# Patient Record
Sex: MALE | Race: OTHER | NOT HISPANIC OR LATINO | URBAN - METROPOLITAN AREA
[De-identification: names, ages, dates, MRNs, and addresses within clinical notes are randomized per-mention and may not be internally consistent; named-entity substitution may affect disease eponyms.]

---

## 2018-06-03 ENCOUNTER — EMERGENCY (EMERGENCY)
Facility: HOSPITAL | Age: 68
LOS: 1 days | Discharge: HOME | End: 2018-06-03
Attending: EMERGENCY MEDICINE
Payer: COMMERCIAL

## 2018-06-03 VITALS
OXYGEN SATURATION: 98 % | RESPIRATION RATE: 18 BRPM | TEMPERATURE: 99 F | DIASTOLIC BLOOD PRESSURE: 83 MMHG | SYSTOLIC BLOOD PRESSURE: 128 MMHG | HEART RATE: 97 BPM

## 2018-06-03 VITALS
TEMPERATURE: 98 F | OXYGEN SATURATION: 96 % | SYSTOLIC BLOOD PRESSURE: 168 MMHG | RESPIRATION RATE: 18 BRPM | DIASTOLIC BLOOD PRESSURE: 108 MMHG | WEIGHT: 149.91 LBS | HEART RATE: 106 BPM | HEIGHT: 66 IN

## 2018-06-03 PROCEDURE — 96372 THER/PROPH/DIAG INJ SC/IM: CPT

## 2018-06-03 PROCEDURE — 93005 ELECTROCARDIOGRAM TRACING: CPT

## 2018-06-03 PROCEDURE — 99283 EMERGENCY DEPT VISIT LOW MDM: CPT | Mod: 25

## 2018-06-03 PROCEDURE — 99284 EMERGENCY DEPT VISIT MOD MDM: CPT | Mod: 25

## 2018-06-03 RX ORDER — DIAZEPAM 5 MG
5 TABLET ORAL ONCE
Qty: 0 | Refills: 0 | Status: DISCONTINUED | OUTPATIENT
Start: 2018-06-03 | End: 2018-06-03

## 2018-06-03 RX ORDER — KETOROLAC TROMETHAMINE 30 MG/ML
30 SYRINGE (ML) INJECTION ONCE
Qty: 0 | Refills: 0 | Status: DISCONTINUED | OUTPATIENT
Start: 2018-06-03 | End: 2018-06-03

## 2018-06-03 RX ORDER — DIAZEPAM 5 MG
1 TABLET ORAL
Qty: 10 | Refills: 0 | OUTPATIENT
Start: 2018-06-03

## 2018-06-03 RX ORDER — OXYCODONE AND ACETAMINOPHEN 5; 325 MG/1; MG/1
1 TABLET ORAL ONCE
Qty: 0 | Refills: 0 | Status: DISCONTINUED | OUTPATIENT
Start: 2018-06-03 | End: 2018-06-03

## 2018-06-03 RX ADMIN — Medication 30 MILLIGRAM(S): at 03:44

## 2018-06-03 RX ADMIN — OXYCODONE AND ACETAMINOPHEN 1 TABLET(S): 5; 325 TABLET ORAL at 03:44

## 2018-06-03 RX ADMIN — Medication 5 MILLIGRAM(S): at 03:44

## 2018-06-03 RX ADMIN — OXYCODONE AND ACETAMINOPHEN 1 TABLET(S): 5; 325 TABLET ORAL at 04:51

## 2018-06-03 RX ADMIN — Medication 30 MILLIGRAM(S): at 04:51

## 2018-06-03 NOTE — ED PROVIDER NOTE - OBJECTIVE STATEMENT
68 y/o with PMHx of HTN, HLD, UTI, arthritis and no significant PSHx presents to the ED with c/o neck pain, and chronic pain down right arm. Pt had MRI showing multiple pinched nerves. Pt has had multiple treatments including pills, epidural and steroids to little relief. Pt reports numbness to right hand which is related to chronic arm pain. Denies acute trauma, weakness, tingling or any other complaints. NKDA.

## 2018-06-03 NOTE — ED PROVIDER NOTE - MEDICAL DECISION MAKING DETAILS
66 y/o M presents with chronic neck pain without weakness on examination. Will try pain medications and reassess.

## 2018-06-03 NOTE — ED ADULT TRIAGE NOTE - STATUS:
Care After Your Extracorporeal Shock Wave Lithotripsy (ESWL)  Dr. Thompson/Dr. Paz/Dr. Rios/Dr. Nath/Dr. Huff/Dr. Galvan  [phone]    Activity  • For the next 24 hours: Do not stay alone, drive a car, operate electrical/power tools or appliances, drink alcohol, or sign any legal papers.  • You should rest for the next 12-24 hours.    Bathing  You may bathe at any time.    Diet   Drink a lot of water.  Have clear liquids and a light meal today.  You may return to your normal diet tomorrow.    Medications  If you are having pain, you may take Tylenol (acetaminophen) 2 tablets every 4 hours as needed for pain.    Special Instructions  Strain all urine for 3 days, and take the fragments to your surgeon.    You may have the following  • Some burning pain with urination.  • Mild pain at the site or lower abdomen.  • Bloody urine for 2-3 days.  • Some redness, swelling, and/or bruising at the site.  • Urinating often.    Call your doctor if you have the following  • Pain that is not controlled by medication.  Pain should not be severe.  • Increasing or constant bleeding or clots in the urine.  • Not passing urine for a 12-hour period.  • Fever higher than 101.5 degrees.  • Constant nausea.    Follow up appointment at [facility] on December 18th at 2:15pm. You will need to have a renal ultrasound and a KUB prior to that day. You will receive a call with more information about date and time for both of those.     Applied

## 2018-06-03 NOTE — ED ADULT NURSE NOTE - OBJECTIVE STATEMENT
Patient presents to ED with neck pain; no difficulty breathing or facial swelling noted. Patient is able to move upper body.

## 2018-06-04 ENCOUNTER — EMERGENCY (EMERGENCY)
Facility: HOSPITAL | Age: 68
LOS: 1 days | Discharge: HOME | End: 2018-06-04
Attending: EMERGENCY MEDICINE
Payer: COMMERCIAL

## 2018-06-04 VITALS
SYSTOLIC BLOOD PRESSURE: 137 MMHG | OXYGEN SATURATION: 97 % | HEIGHT: 66 IN | DIASTOLIC BLOOD PRESSURE: 91 MMHG | TEMPERATURE: 98 F | HEART RATE: 106 BPM | WEIGHT: 149.91 LBS | RESPIRATION RATE: 17 BRPM

## 2018-06-04 PROCEDURE — 99283 EMERGENCY DEPT VISIT LOW MDM: CPT | Mod: 25

## 2018-06-04 PROCEDURE — 96372 THER/PROPH/DIAG INJ SC/IM: CPT | Mod: 76

## 2018-06-04 RX ORDER — HYDROMORPHONE HYDROCHLORIDE 2 MG/ML
1 INJECTION INTRAMUSCULAR; INTRAVENOUS; SUBCUTANEOUS ONCE
Qty: 0 | Refills: 0 | Status: DISCONTINUED | OUTPATIENT
Start: 2018-06-04 | End: 2018-06-04

## 2018-06-04 RX ORDER — KETOROLAC TROMETHAMINE 30 MG/ML
30 SYRINGE (ML) INJECTION ONCE
Qty: 0 | Refills: 0 | Status: DISCONTINUED | OUTPATIENT
Start: 2018-06-04 | End: 2018-06-04

## 2018-06-04 RX ADMIN — HYDROMORPHONE HYDROCHLORIDE 1 MILLIGRAM(S): 2 INJECTION INTRAMUSCULAR; INTRAVENOUS; SUBCUTANEOUS at 08:03

## 2018-06-04 RX ADMIN — HYDROMORPHONE HYDROCHLORIDE 1 MILLIGRAM(S): 2 INJECTION INTRAMUSCULAR; INTRAVENOUS; SUBCUTANEOUS at 07:26

## 2018-06-04 RX ADMIN — Medication 30 MILLIGRAM(S): at 07:26

## 2018-06-04 RX ADMIN — Medication 30 MILLIGRAM(S): at 08:03

## 2018-06-04 NOTE — ED PROVIDER NOTE - PROGRESS NOTE DETAILS
pt reports pain has improved after IM meds. patient stable for discharge to f/u with Dr. Crespo for reevaluation.

## 2018-06-04 NOTE — ED PROVIDER NOTE - OBJECTIVE STATEMENT
66yo M with chronic neck pain from "pinched nerves" presents with neck pain. Seen in ED yesterday for similar pain, reports he felt better until last night when pain worsened. Took valium at 4 am without significant relief. Denies new trauma. Recently had MRI cspine with showed nerve compression-pt has report with him. Followed by orthopedist and pain management who have referred him to spine specialist, has appt in 4 days. Denies new arm/leg weakness/numbness. Reports pain started a few months ago. Reports taking percocet/ gabapentin without relief. Yesterday prescribed valium.

## 2018-06-04 NOTE — ED ADULT TRIAGE NOTE - CHIEF COMPLAINT QUOTE
BIBEMS from home, c/o right side of the neck pain radiating to right arm.  Pt states he was seen & discharged here yesterday & was given prescription for Valium .  Pt claimed, he took Valium an hour ago without any relief

## 2018-06-04 NOTE — ED PROVIDER NOTE - MEDICAL DECISION MAKING DETAILS
68yo M with chronic neck pain presents with worsening neck pain. Given IM toradol/dilaudid. will reassess.

## 2018-06-04 NOTE — ED ADULT NURSE NOTE - CAS EDN DISCHARGE ASSESSMENT
Awake/No adverse reaction to first time med in ED/Alert and oriented to person, place and time/Symptoms improved

## 2018-06-04 NOTE — ED ADULT NURSE NOTE - OBJECTIVE STATEMENT
came back due to pain not getting to neck goes to right shoulder and right arm.Pain been there x 2 years and getting worst.

## 2021-11-26 NOTE — ED ADULT TRIAGE NOTE - TEMPERATURE IN CELSIUS (DEGREES C)
Admission Reconciliation is Not Complete  Discharge Reconciliation is Not Complete 36.6 Admission Reconciliation is Not Complete  Discharge Reconciliation is Completed

## 2022-08-30 NOTE — ED ADULT TRIAGE NOTE - WEIGHT METHOD
Jose is the spelling.     Chart review listed below.   Socioeconomic History   • Marital status:        Spouse name: Brain       Adjusted the area where it has  's name and adjusted, please sign if needing addendum or if needing to know where to go please let me know and writer can help assist you.     stated

## 2024-09-13 NOTE — ED ADULT NURSE NOTE - PAIN: BODY LOCATION
SUBJECTIVE:   Moreno Carty is a 24 year old right handed male returning to the clinic for a follow-up appointment regarding his left foot pain secondary to a displaced second metatarsal shaft fracture and a nondisplaced first metatarsal shaft fracture.  He injured this area on 8/1/2024.  He was last seen in this office on 9/13/2024.  He was placed into a post-op shoe and instructed to wear it while weight bearing for 2 weeks.  He reports *** compliance with these instructions.  At present, he rates his pain at a {0-10:734779}/10 on the pain scale, describes the pain as {GEN PAIN:503089}, and localizes the pain to the ***.  He denies any neurological symptoms.  He states that *** will increase his pain.   Accompanied by: ***  Date of injury: 8/1/2024  Location: left dorsal foot  Severity:{number 1-15:[id number]}/10  Timing: {PAIN DESCRIPTION:800648}   Quality: {GEN PAIN:847358}  Patient feels at ***/100  Works in general construction    OBJECTIVE:  There were no vitals taken for this visit.  This is a 24 year old male, awake, alert, oriented x3, and cooperative.    He is well nourished, well developed, and in no apparent distress.    Patient presents to the clinic with an intact *** that is without evidence of excessive wear or soil.  *** was removed to reveal skin that is pink, warm, and dry without any evidence of any sores or lesions.  Active range of motion of the *** appears to be *** when compared to uninvolved side.  *** tenderness noted upon palpation of the ***.  Strength testing reveals ***.  There are no indications of any neurovascular compromise.     Radiographs obtained today were reviewed with Dr. Pena.  See his dictation.    ASSESSMENT:  Moreno Carty is a 24 year old male with a ***.    PLAN (Per Dr. Pena):  ***    No diagnosis found.    Electronically signed by: *** 9/13/2024     ***   neck